# Patient Record
Sex: FEMALE
[De-identification: names, ages, dates, MRNs, and addresses within clinical notes are randomized per-mention and may not be internally consistent; named-entity substitution may affect disease eponyms.]

---

## 2022-11-15 ENCOUNTER — NURSE TRIAGE (OUTPATIENT)
Dept: OTHER | Facility: CLINIC | Age: 32
End: 2022-11-15

## 2022-11-15 NOTE — TELEPHONE ENCOUNTER
Location of patient: [place]    Subjective: Caller states \"abdominal discomfort\"     Current Symptoms: Patient reports she was having flu like symptoms Saturday that have almost disappeared but she is experiencing some abdominal discomfort. Last bowel movement was x2hrs ago and was normal, no blood; no belching; feels similar to bloating but more like tension; discomfort located right under rib cage, center; right feels tenderness, not sharp. Onset: started last night, increasing ago; gradual, worsening    Associated Symptoms:  nausea that goes away; denies vomiting    Pain Severity: uncomfortable, not painful- tender to touch 3-4 /10; tender; constant    Temperature: reports fever- tactileby unknown method    What has been tried: has been taking Ibuprofen and Excedrin since Saturday    LMP:  11/14/22  Pregnant: No    Recommended disposition: See PCP within 24 Hours    Care advice provided, patient verbalizes understanding; denies any other questions or concerns; instructed to call back for any new or worsening symptoms. Patient/caller agrees to proceed to nearest [facility] . RN upgraded patient's disposition due to prolonged use of NSAIDS/aspirin and discomfort. This triage is a result of a call to [address] [place] [address]. Please do not respond to the triage nurse through this encounter. Any subsequent communication should be directly with the patient.         Reason for Disposition   [1] MODERATE pain (e.g., interferes with normal activities) AND [2] comes and goes (cramps) AND [3] present > 24 hours  (Exception: pain with Vomiting or Diarrhea - see that Guideline)    Protocols used: Abdominal Pain - Upper-ADULT-